# Patient Record
Sex: FEMALE | Race: WHITE | Employment: FULL TIME | URBAN - METROPOLITAN AREA
[De-identification: names, ages, dates, MRNs, and addresses within clinical notes are randomized per-mention and may not be internally consistent; named-entity substitution may affect disease eponyms.]

---

## 2019-08-03 ENCOUNTER — HOSPITAL ENCOUNTER (EMERGENCY)
Age: 49
Discharge: ANOTHER HOSPITAL | End: 2019-08-04
Attending: EMERGENCY MEDICINE

## 2019-08-03 ENCOUNTER — APPOINTMENT (OUTPATIENT)
Dept: GENERAL RADIOLOGY | Age: 49
End: 2019-08-03

## 2019-08-03 DIAGNOSIS — S41.112A ARM LACERATION WITH COMPLICATION, LEFT, INITIAL ENCOUNTER: Primary | ICD-10-CM

## 2019-08-03 DIAGNOSIS — S01.81XA FACIAL LACERATION, INITIAL ENCOUNTER: ICD-10-CM

## 2019-08-03 LAB
BASOPHILS ABSOLUTE: 0.1 K/UL (ref 0–0.2)
BASOPHILS RELATIVE PERCENT: 1.5 %
EOSINOPHILS ABSOLUTE: 0.6 K/UL (ref 0–0.6)
EOSINOPHILS RELATIVE PERCENT: 8 %
HCT VFR BLD CALC: 41.4 % (ref 36–48)
HEMOGLOBIN: 14 G/DL (ref 12–16)
LYMPHOCYTES ABSOLUTE: 2.1 K/UL (ref 1–5.1)
LYMPHOCYTES RELATIVE PERCENT: 26.3 %
MCH RBC QN AUTO: 33.9 PG (ref 26–34)
MCHC RBC AUTO-ENTMCNC: 33.8 G/DL (ref 31–36)
MCV RBC AUTO: 100.3 FL (ref 80–100)
MONOCYTES ABSOLUTE: 1 K/UL (ref 0–1.3)
MONOCYTES RELATIVE PERCENT: 12 %
NEUTROPHILS ABSOLUTE: 4.2 K/UL (ref 1.7–7.7)
NEUTROPHILS RELATIVE PERCENT: 52.2 %
PDW BLD-RTO: 15 % (ref 12.4–15.4)
PLATELET # BLD: 283 K/UL (ref 135–450)
PMV BLD AUTO: 7.1 FL (ref 5–10.5)
RBC # BLD: 4.13 M/UL (ref 4–5.2)
WBC # BLD: 8 K/UL (ref 4–11)

## 2019-08-03 PROCEDURE — 6360000002 HC RX W HCPCS: Performed by: EMERGENCY MEDICINE

## 2019-08-03 PROCEDURE — 99285 EMERGENCY DEPT VISIT HI MDM: CPT

## 2019-08-03 PROCEDURE — 90471 IMMUNIZATION ADMIN: CPT | Performed by: EMERGENCY MEDICINE

## 2019-08-03 PROCEDURE — 85025 COMPLETE CBC W/AUTO DIFF WBC: CPT

## 2019-08-03 PROCEDURE — G0480 DRUG TEST DEF 1-7 CLASSES: HCPCS

## 2019-08-03 PROCEDURE — 2580000003 HC RX 258: Performed by: EMERGENCY MEDICINE

## 2019-08-03 PROCEDURE — 71045 X-RAY EXAM CHEST 1 VIEW: CPT

## 2019-08-03 PROCEDURE — 96374 THER/PROPH/DIAG INJ IV PUSH: CPT

## 2019-08-03 PROCEDURE — 73070 X-RAY EXAM OF ELBOW: CPT

## 2019-08-03 PROCEDURE — 90715 TDAP VACCINE 7 YRS/> IM: CPT | Performed by: EMERGENCY MEDICINE

## 2019-08-03 PROCEDURE — 80048 BASIC METABOLIC PNL TOTAL CA: CPT

## 2019-08-03 RX ADMIN — CEFAZOLIN 2 G: 1 INJECTION, POWDER, FOR SOLUTION INTRAMUSCULAR; INTRAVENOUS at 23:54

## 2019-08-03 RX ADMIN — TETANUS TOXOID, REDUCED DIPHTHERIA TOXOID AND ACELLULAR PERTUSSIS VACCINE, ADSORBED 0.5 ML: 5; 2.5; 8; 8; 2.5 SUSPENSION INTRAMUSCULAR at 23:42

## 2019-08-04 VITALS
TEMPERATURE: 98.4 F | OXYGEN SATURATION: 93 % | HEART RATE: 99 BPM | RESPIRATION RATE: 18 BRPM | SYSTOLIC BLOOD PRESSURE: 141 MMHG | DIASTOLIC BLOOD PRESSURE: 101 MMHG | WEIGHT: 160 LBS

## 2019-08-04 LAB
ANION GAP SERPL CALCULATED.3IONS-SCNC: 18 MMOL/L (ref 3–16)
BUN BLDV-MCNC: 8 MG/DL (ref 7–20)
CALCIUM SERPL-MCNC: 9.1 MG/DL (ref 8.3–10.6)
CHLORIDE BLD-SCNC: 95 MMOL/L (ref 99–110)
CO2: 22 MMOL/L (ref 21–32)
CREAT SERPL-MCNC: 0.7 MG/DL (ref 0.6–1.1)
ETHANOL: 358 MG/DL (ref 0–0.08)
GFR AFRICAN AMERICAN: >60
GFR NON-AFRICAN AMERICAN: >60
GLUCOSE BLD-MCNC: 110 MG/DL (ref 70–99)
POTASSIUM SERPL-SCNC: 3.8 MMOL/L (ref 3.5–5.1)
SODIUM BLD-SCNC: 135 MMOL/L (ref 136–145)

## 2019-08-04 NOTE — ED PROVIDER NOTES
[id number] [facility] [place] RESIDENT NOTE       Date of evaluation: 8/3/2019    Chief Complaint     Laceration (L arm)      History of Present Illness     Ulises Aiken is a 54 y.o. female who presents for evaluation following a fall while climbing over a fence. Patient was at a bar earlier and she attempted to climb a fence. She fell while at the top catching her left arm on the fence and falling to the ground. She suffered a large cut to the inner portion of her left elbow as well as smaller cuts/abrasions to her left cheek and left knee. Patient has been drinking. The  drove her to the [facility] for emergent evaluation. After presenting to the [facility] she was noted to have a fair amount of bleeding and a tourniquet was placed by a staff. And the patient was brought to the [facility] for evaluation. Here the patient confirmed above story. She is currently reporting pain particularly in her left arm but denies any chest pain or trouble breathing. Denies any abdominal pain. Denies any use of anticoagulation. Patient is unsure when her last tetanus booster was received. Patient states that she does not believe that she is pregnant. She denies any headache or neck pain. Review of Systems     Review of Systems    See above for pertinent positives and negatives. Thorough review of systems was completed however patient may be a poor historian due to her current level intoxication. Past Medical, Surgical, Family, and Social History      She has no past medical history on file. She has no past surgical history on file. Her family history is not on file. She     Medications     Previous Medications    No medications on file       Allergies     She has no allergies on file.     Physical Exam     INITIAL VITALS: BP: (!) 128/109, Temp: 98.4 °F (36.9 °C), Pulse: 110, Resp: 16, SpO2: 92 %   Physical Exam    General:  Intoxicated adult female, speaking in complete sentences  HEENT:  Normocephalic, small 1 similar laceration to the left cheek that does not penetrate into the oral cavity, PERRL, extra-ocular movements intact, oropharynx benign  Neck:  Supple, no lymphadenopathy, trachea midline, no masses  Pulmonary:   Clear to auscultation bilaterally, good air movement, no wheezes  Cardiac: Tachycardic   Abdomen:  Soft,non-tender, non-distended, no rebounding or guarding, normoactive borborygmus  Musculoskeletal: Tourniquet in place with no active arterial or venous bleeding from the approximately 20 centimeter curvilinear laceration over the medial aspect of the left upper extremity that does cross the elbow. There is exposed muscle tissue with no obvious osseous exposure. Upon removal of the tourniquet there is steady venous bleeding with no pulsatility. There was a palpable radial pulse with tourniquet down.   Skin: Scattered abrasions to the left lower anterior extremity  Neuro: Alert and oriented X 4,able to move all four extremities with equal strength bilaterally, sensory exam grossly intact, cranial nerves II-XII intact  Psych:  Appropriate mood and affect    Diagnostic Results         RADIOLOGY:  XR CHEST PORTABLE    (Results Pending)   XR ELBOW LEFT (2 VIEWS)    (Results Pending)       LABS:   Results for orders placed or performed during the hospital encounter of 08/03/19   CBC auto differential   Result Value Ref Range    WBC 8.0 4.0 - 11.0 K/uL    RBC 4.13 4.00 - 5.20 M/uL    Hemoglobin 14.0 12.0 - 16.0 g/dL    Hematocrit 41.4 36.0 - 48.0 %    .3 (H) 80.0 - 100.0 fL    MCH 33.9 26.0 - 34.0 pg    MCHC 33.8 31.0 - 36.0 g/dL    RDW 15.0 12.4 - 15.4 %    Platelets 860 195 - 911 K/uL    MPV 7.1 5.0 - 10.5 fL    Neutrophils % 52.2 %    Lymphocytes % 26.3 %    Monocytes % 12.0 %    Eosinophils % 8.0 %    Basophils % 1.5 %    Neutrophils # 4.2 1.7 - 7.7 K/uL    Lymphocytes # 2.1 1.0 - 5.1 K/uL    Monocytes # 1.0 0.0 - 1.3 K/uL    Eosinophils # 0.6 0.0 - 0.6 K/uL    Basophils # 0.1 0.0 - 0.2 K/uL       ED BEDSIDE ULTRASOUND:  FAST EXAM:  A limited, bedside FAST exam was performed. The medical necessity was to evaluate for the presence or absence of intraperitoneal or pericardial fluid. The structures studied were the hepatorenal space, splenorenal space, pericardium, and bladder. FINDINGS:  negative for free intra-abdominal fluid. The study was technically adequate. RECENT VITALS:  BP: (!) 155/98, Temp: 98.4 °F (36.9 °C), Pulse: 110, Resp: 16, SpO2: 95 %     Procedures     Tourniquet placed in the left upper extremity in the mid humeral region at 2335. ED Course     Nursing Notes, Past Medical Hx, Past Surgical Hx, Social Hx, Allergies, and Family Hx were reviewed. The patient was given the following medications:  Orders Placed This Encounter   Medications    ceFAZolin (ANCEF) 2 g in dextrose 5 % 50 mL IVPB    Tetanus-Diphth-Acell Pertussis (BOOSTRIX) injection 0.5 mL       CONSULTS:  None    MEDICAL DECISION MAKING / ASSESSMENT / Jim Andrea is a 54 y.o. female with a history andpresentation as described above in HPI. The patient was evaluated by myself and the ED Attending Physician. All management and disposition plans were discussed and agreed upon. Patient presents to the emergency department for evaluation following a fall while attempting to climb over a fence while intoxicated. It is unclear if she struck her head or loss consciousness. She is intoxicated upon arrival but speaking in complete sentences. Airway is intact with bilateral breath sounds and strong central pulses. Tourniquet had been placed in the lobby by staff with no palpable radial pulse in her left upper extremity. Upon inspection of the wound with tourniquet removed she has a strong radial pulse and no arterial bleeding from her large curvilinear laceration to the medial aspect of the upper extremity as described above.   There was a steady